# Patient Record
Sex: MALE | Race: ASIAN | NOT HISPANIC OR LATINO | Employment: FULL TIME | URBAN - METROPOLITAN AREA
[De-identification: names, ages, dates, MRNs, and addresses within clinical notes are randomized per-mention and may not be internally consistent; named-entity substitution may affect disease eponyms.]

---

## 2020-05-21 ENCOUNTER — HOSPITAL ENCOUNTER (OUTPATIENT)
Facility: MEDICAL CENTER | Age: 60
End: 2020-05-21
Payer: COMMERCIAL

## 2020-05-26 LAB
SARS-COV-2 RNA SPEC QL NAA+PROBE: NOT DETECTED
SPECIMEN SOURCE: NORMAL

## 2021-04-12 ENCOUNTER — OFFICE VISIT (OUTPATIENT)
Dept: MEDICAL GROUP | Age: 61
End: 2021-04-12
Payer: COMMERCIAL

## 2021-04-12 VITALS
OXYGEN SATURATION: 95 % | HEIGHT: 65 IN | DIASTOLIC BLOOD PRESSURE: 90 MMHG | TEMPERATURE: 97.4 F | WEIGHT: 133 LBS | BODY MASS INDEX: 22.16 KG/M2 | SYSTOLIC BLOOD PRESSURE: 180 MMHG | HEART RATE: 80 BPM

## 2021-04-12 DIAGNOSIS — R42 VERTIGO: ICD-10-CM

## 2021-04-12 DIAGNOSIS — I10 ESSENTIAL HYPERTENSION: ICD-10-CM

## 2021-04-12 DIAGNOSIS — Z00.00 BLOOD TESTS FOR ROUTINE GENERAL PHYSICAL EXAMINATION: ICD-10-CM

## 2021-04-12 DIAGNOSIS — Z76.89 ESTABLISHING CARE WITH NEW DOCTOR, ENCOUNTER FOR: ICD-10-CM

## 2021-04-12 DIAGNOSIS — Z12.5 SCREENING FOR PROSTATE CANCER: ICD-10-CM

## 2021-04-12 DIAGNOSIS — Z11.59 NEED FOR HEPATITIS C SCREENING TEST: ICD-10-CM

## 2021-04-12 PROCEDURE — 99203 OFFICE O/P NEW LOW 30 MIN: CPT | Performed by: PHYSICIAN ASSISTANT

## 2021-04-12 RX ORDER — ADHESIVE BANDAGE 3/4"
BANDAGE TOPICAL
Qty: 1 EACH | Refills: 0 | Status: SHIPPED | OUTPATIENT
Start: 2021-04-12

## 2021-04-12 RX ORDER — LISINOPRIL 20 MG/1
20 TABLET ORAL DAILY
Qty: 30 TABLET | Refills: 2 | Status: SHIPPED | OUTPATIENT
Start: 2021-04-12 | End: 2021-04-26 | Stop reason: SDUPTHER

## 2021-04-12 ASSESSMENT — PATIENT HEALTH QUESTIONNAIRE - PHQ9: CLINICAL INTERPRETATION OF PHQ2 SCORE: 0

## 2021-04-12 NOTE — PROGRESS NOTES
"cc: Establish care    Subjective:     HPI  Rashaad Bowman is a 60 y.o. male presenting to establish care.  It has been over 10 years since he has been seen by primary care provider.  He works at the [employer] as a .  He has no significant past medical history.  He is up-to-date on colonoscopy.    Hypertension  Blood pressure is elevated in clinic today.  In further discussion he states that he was drinking fairly excessively, at least half a bottle of wine and 2-3 shots of scotch a night.  He has cut back on this and has 1 to 2 glasses of wine nightly now.  Denies chest pain, palpitations, shortness of breath, headaches, visual changes, mental status changes, abdominal pain, nausea, vomiting, lower extremity edema.    Vertigo  In the past 6 weeks he has had 2 separate episodes of sudden onset of dizziness.  He states that it occurs with positional changes, feels like the room is spinning.  Resolves within less than a minute.  It has been 2 weeks since he has felt dizzy.  Denies headaches, mental status changes, weakness, slurred speech, nausea, vomiting, diaphoresis.    Review of systems:  See above.       Current Outpatient Medications:   •  lisinopril (PRINIVIL) 20 MG Tab, Take 1 tablet by mouth every day., Disp: 30 tablet, Rfl: 2  •  Blood Pressure Monitoring (BLOOD PRESSURE CUFF) Misc, 1 adult BP Cuff, Disp: 1 Each, Rfl: 0    Allergies, past medical history, past surgical history, family history, social history reviewed and updated    Objective:     Vitals: BP (!) 180/90   Pulse 80   Temp 36.3 °C (97.4 °F) (Temporal)   Ht 1.651 m (5' 5\")   Wt 60.3 kg (133 lb)   SpO2 95%   BMI 22.13 kg/m²   General: Alert, pleasant, NAD  HEENT: Normocephalic. Neck supple.  No thyromegaly or masses palpated. No cervical or supraclavicular lymphadenopathy. No carotid bruits   Heart: Regular rate and rhythm.  S1 and S2 normal.  No murmurs appreciated.  Respiratory: Normal respiratory effort.  Clear to auscultation " bilaterally.  Neurological: No tremors, sensation grossly intact, patellar and biceps reflexes 2+ symmetric, tone/strength normal, gait is normal, rapid movements normal, finger-to-nose intact, heel-knee-shin intact, CN2-12 intact, no pronator drift, romberg negative, no clonus.  Negative [place]-Hallpike maneuver.  Skin: Warm, dry, no rashes.  Extremities: No leg edema.  Radial pulses 2+ symmetric  Psych:  Affect/mood is normal, judgement is good, memory is intact, grooming is appropriate.    Assessment/Plan:     Rashaad was seen today for establish care and dizziness.    Diagnoses and all orders for this visit:    Essential hypertension  -Blood pressure is moderately elevated in clinic today.  No signs of endorgan damage.  Will check below labs.  Will also start on 20 mg of lisinopril.  Medication side effects reviewed.  We will follow-up in 2 weeks to review blood pressure log.  Discussed abusers of presentation of headache, nausea, vomiting, diaphoresis, chest pain to present to ED immediately.  -     Comp Metabolic Panel; Future  -     Lipid Profile; Future  -     TSH WITH REFLEX TO FT4; Future  -     lisinopril (PRINIVIL) 20 MG Tab; Take 1 tablet by mouth every day.  -     Blood Pressure Monitoring (BLOOD PRESSURE CUFF) Misc; 1 adult BP Cuff    Vertigo  -No neuro exam is benign.  Negative [place]-Hallpike maneuver.  Is been over 2 weeks since he has had an episode of dizziness.  Based off of description sounds like BPPV.  Discussed if symptoms represent then to follow-up with the clinic.    Blood tests for routine general physical examination  -     CBC WITH DIFFERENTIAL; Future  -     Comp Metabolic Panel; Future  -     Lipid Profile; Future  -     TSH WITH REFLEX TO FT4; Future    Screening for prostate cancer  -     PROSTATE SPECIFIC AG SCREENING; Future    Need for hepatitis C screening test  -     HCV Scrn ( 9926-0909 [employer]); Future    Establishing care with new doctor, encounter for  -We will request records  and review when received.  Discussed immunizations today, he declines.      Return in about 2 weeks (around 4/26/2021) for HTN, Lab Review.

## 2021-04-19 ENCOUNTER — HOSPITAL ENCOUNTER (OUTPATIENT)
Dept: LAB | Facility: MEDICAL CENTER | Age: 61
End: 2021-04-19
Attending: PHYSICIAN ASSISTANT
Payer: COMMERCIAL

## 2021-04-19 DIAGNOSIS — Z00.00 BLOOD TESTS FOR ROUTINE GENERAL PHYSICAL EXAMINATION: ICD-10-CM

## 2021-04-19 DIAGNOSIS — D75.89 MACROCYTOSIS WITHOUT ANEMIA: ICD-10-CM

## 2021-04-19 DIAGNOSIS — I10 ESSENTIAL HYPERTENSION: ICD-10-CM

## 2021-04-19 DIAGNOSIS — Z12.5 SCREENING FOR PROSTATE CANCER: ICD-10-CM

## 2021-04-19 DIAGNOSIS — Z11.59 NEED FOR HEPATITIS C SCREENING TEST: ICD-10-CM

## 2021-04-19 LAB
ALBUMIN SERPL BCP-MCNC: 4 G/DL (ref 3.2–4.9)
ALBUMIN/GLOB SERPL: 1.3 G/DL
ALP SERPL-CCNC: 39 U/L (ref 30–99)
ALT SERPL-CCNC: 15 U/L (ref 2–50)
ANION GAP SERPL CALC-SCNC: 7 MMOL/L (ref 7–16)
AST SERPL-CCNC: 15 U/L (ref 12–45)
BASOPHILS # BLD AUTO: 0.6 % (ref 0–1.8)
BASOPHILS # BLD: 0.03 K/UL (ref 0–0.12)
BILIRUB SERPL-MCNC: 0.9 MG/DL (ref 0.1–1.5)
BUN SERPL-MCNC: 22 MG/DL (ref 8–22)
CALCIUM SERPL-MCNC: 8.9 MG/DL (ref 8.5–10.5)
CHLORIDE SERPL-SCNC: 102 MMOL/L (ref 96–112)
CHOLEST SERPL-MCNC: 246 MG/DL (ref 100–199)
CO2 SERPL-SCNC: 24 MMOL/L (ref 20–33)
CREAT SERPL-MCNC: 1.39 MG/DL (ref 0.5–1.4)
EOSINOPHIL # BLD AUTO: 0.39 K/UL (ref 0–0.51)
EOSINOPHIL NFR BLD: 8.3 % (ref 0–6.9)
ERYTHROCYTE [DISTWIDTH] IN BLOOD BY AUTOMATED COUNT: 49.2 FL (ref 35.9–50)
FASTING STATUS PATIENT QL REPORTED: NORMAL
GLOBULIN SER CALC-MCNC: 3 G/DL (ref 1.9–3.5)
GLUCOSE SERPL-MCNC: 89 MG/DL (ref 65–99)
HCT VFR BLD AUTO: 47.1 % (ref 42–52)
HCV AB SER QL: NORMAL
HDLC SERPL-MCNC: 75 MG/DL
HGB BLD-MCNC: 15.6 G/DL (ref 14–18)
IMM GRANULOCYTES # BLD AUTO: 0.04 K/UL (ref 0–0.11)
IMM GRANULOCYTES NFR BLD AUTO: 0.8 % (ref 0–0.9)
LDLC SERPL CALC-MCNC: 157 MG/DL
LYMPHOCYTES # BLD AUTO: 1.81 K/UL (ref 1–4.8)
LYMPHOCYTES NFR BLD: 38.4 % (ref 22–41)
MCH RBC QN AUTO: 33.2 PG (ref 27–33)
MCHC RBC AUTO-ENTMCNC: 33.1 G/DL (ref 33.7–35.3)
MCV RBC AUTO: 100.2 FL (ref 81.4–97.8)
MONOCYTES # BLD AUTO: 0.55 K/UL (ref 0–0.85)
MONOCYTES NFR BLD AUTO: 11.7 % (ref 0–13.4)
NEUTROPHILS # BLD AUTO: 1.89 K/UL (ref 1.82–7.42)
NEUTROPHILS NFR BLD: 40.2 % (ref 44–72)
NRBC # BLD AUTO: 0 K/UL
NRBC BLD-RTO: 0 /100 WBC
PLATELET # BLD AUTO: 163 K/UL (ref 164–446)
PMV BLD AUTO: 10.3 FL (ref 9–12.9)
POTASSIUM SERPL-SCNC: 4.1 MMOL/L (ref 3.6–5.5)
PROT SERPL-MCNC: 7 G/DL (ref 6–8.2)
PSA SERPL-MCNC: 1.84 NG/ML (ref 0–4)
RBC # BLD AUTO: 4.7 M/UL (ref 4.7–6.1)
SODIUM SERPL-SCNC: 133 MMOL/L (ref 135–145)
TRIGL SERPL-MCNC: 69 MG/DL (ref 0–149)
TSH SERPL DL<=0.005 MIU/L-ACNC: 4.03 UIU/ML (ref 0.38–5.33)
WBC # BLD AUTO: 4.7 K/UL (ref 4.8–10.8)

## 2021-04-19 PROCEDURE — 84443 ASSAY THYROID STIM HORMONE: CPT

## 2021-04-19 PROCEDURE — 80061 LIPID PANEL: CPT

## 2021-04-19 PROCEDURE — 85025 COMPLETE CBC W/AUTO DIFF WBC: CPT

## 2021-04-19 PROCEDURE — 36415 COLL VENOUS BLD VENIPUNCTURE: CPT

## 2021-04-19 PROCEDURE — G0472 HEP C SCREEN HIGH RISK/OTHER: HCPCS

## 2021-04-19 PROCEDURE — 80053 COMPREHEN METABOLIC PANEL: CPT

## 2021-04-19 PROCEDURE — 84153 ASSAY OF PSA TOTAL: CPT

## 2021-04-21 ENCOUNTER — HOSPITAL ENCOUNTER (OUTPATIENT)
Dept: LAB | Facility: MEDICAL CENTER | Age: 61
End: 2021-04-21
Attending: PHYSICIAN ASSISTANT
Payer: COMMERCIAL

## 2021-04-21 DIAGNOSIS — D75.89 MACROCYTOSIS WITHOUT ANEMIA: ICD-10-CM

## 2021-04-21 PROCEDURE — 36415 COLL VENOUS BLD VENIPUNCTURE: CPT

## 2021-04-21 PROCEDURE — 82607 VITAMIN B-12: CPT

## 2021-04-22 LAB — VIT B12 SERPL-MCNC: 361 PG/ML (ref 211–911)

## 2021-04-26 ENCOUNTER — OFFICE VISIT (OUTPATIENT)
Dept: MEDICAL GROUP | Age: 61
End: 2021-04-26
Payer: COMMERCIAL

## 2021-04-26 VITALS
WEIGHT: 138 LBS | OXYGEN SATURATION: 97 % | SYSTOLIC BLOOD PRESSURE: 160 MMHG | DIASTOLIC BLOOD PRESSURE: 86 MMHG | TEMPERATURE: 96.9 F | HEART RATE: 74 BPM | BODY MASS INDEX: 22.99 KG/M2 | HEIGHT: 65 IN

## 2021-04-26 DIAGNOSIS — D75.89 MACROCYTOSIS WITHOUT ANEMIA: ICD-10-CM

## 2021-04-26 DIAGNOSIS — I10 ESSENTIAL HYPERTENSION: ICD-10-CM

## 2021-04-26 DIAGNOSIS — E78.2 MIXED HYPERLIPIDEMIA: ICD-10-CM

## 2021-04-26 DIAGNOSIS — N28.9 DECREASED RENAL FUNCTION: ICD-10-CM

## 2021-04-26 PROBLEM — E78.5 HYPERLIPIDEMIA: Status: ACTIVE | Noted: 2021-04-26

## 2021-04-26 PROCEDURE — 99213 OFFICE O/P EST LOW 20 MIN: CPT | Performed by: PHYSICIAN ASSISTANT

## 2021-04-26 RX ORDER — LISINOPRIL 40 MG/1
40 TABLET ORAL DAILY
Qty: 90 TABLET | Refills: 3 | Status: SHIPPED | OUTPATIENT
Start: 2021-04-26 | End: 2021-05-20

## 2021-04-26 ASSESSMENT — FIBROSIS 4 INDEX: FIB4 SCORE: 1.4

## 2021-04-26 NOTE — ASSESSMENT & PLAN NOTE
Rashaad presents for follow-up hypertension.  He was started on 20 mg of lisinopril 2 weeks ago.  He is tolerating the medication well.  He has been monitoring his blood pressure at home and it ranges anywhere from the mid 140s-  low 150 systolic.  He did also have labs done and would like to review this today.  Denies dizziness, chest pain, palpitations, shortness of breath, lower extremity edema.

## 2021-04-26 NOTE — PROGRESS NOTES
cc: Hypertension and lab review    Subjective:     HPI    Essential hypertension  Rashaad presents for follow-up hypertension.  He was started on 20 mg of lisinopril 2 weeks ago.  He is tolerating the medication well.  He has been monitoring his blood pressure at home and it ranges anywhere from the mid 140s-  low 150 systolic.  He did also have labs done and would like to review this today.  Denies dizziness, chest pain, palpitations, shortness of breath, lower extremity edema.    Results for RASHAAD JEFFERS (MRN [id number]) as of 4/26/2021 08:14   Ref. Range 4/19/2021 08:31   WBC Latest Ref Range: 4.8 - 10.8 K/uL 4.7 (L)   RBC Latest Ref Range: 4.70 - 6.10 M/uL 4.70   Hemoglobin Latest Ref Range: 14.0 - 18.0 g/dL 15.6   Hematocrit Latest Ref Range: 42.0 - 52.0 % 47.1   MCV Latest Ref Range: 81.4 - 97.8 fL 100.2 (H)   MCH Latest Ref Range: 27.0 - 33.0 pg 33.2 (H)   MCHC Latest Ref Range: 33.7 - 35.3 g/dL 33.1 (L)   RDW Latest Ref Range: 35.9 - 50.0 fL 49.2   Platelet Count Latest Ref Range: 164 - 446 K/uL 163 (L)   MPV Latest Ref Range: 9.0 - 12.9 fL 10.3   Neutrophils-Polys Latest Ref Range: 44.00 - 72.00 % 40.20 (L)   Neutrophils (Absolute) Latest Ref Range: 1.82 - 7.42 K/uL 1.89   Lymphocytes Latest Ref Range: 22.00 - 41.00 % 38.40   Lymphs (Absolute) Latest Ref Range: 1.00 - 4.80 K/uL 1.81   Monocytes Latest Ref Range: 0.00 - 13.40 % 11.70   Monos (Absolute) Latest Ref Range: 0.00 - 0.85 K/uL 0.55   Eosinophils Latest Ref Range: 0.00 - 6.90 % 8.30 (H)   Eos (Absolute) Latest Ref Range: 0.00 - 0.51 K/uL 0.39   Basophils Latest Ref Range: 0.00 - 1.80 % 0.60   Baso (Absolute) Latest Ref Range: 0.00 - 0.12 K/uL 0.03   Immature Granulocytes Latest Ref Range: 0.00 - 0.90 % 0.80   Immature Granulocytes (abs) Latest Ref Range: 0.00 - 0.11 K/uL 0.04   Nucleated RBC Latest Units: /100 WBC 0.00   NRBC (Absolute) Latest Units: K/uL 0.00   Sodium Latest Ref Range: 135 - 145 mmol/L 133 (L)   Potassium Latest Ref Range: 3.6 - 5.5  mmol/L 4.1   Chloride Latest Ref Range: 96 - 112 mmol/L 102   Co2 Latest Ref Range: 20 - 33 mmol/L 24   Anion Gap Latest Ref Range: 7.0 - 16.0  7.0   Glucose Latest Ref Range: 65 - 99 mg/dL 89   Bun Latest Ref Range: 8 - 22 mg/dL 22   Creatinine Latest Ref Range: 0.50 - 1.40 mg/dL 1.39   GFR If  Latest Ref Range: >60 mL/min/1.73 m 2 >60   GFR If Non  Latest Ref Range: >60 mL/min/1.73 m 2 52 (A)   Calcium Latest Ref Range: 8.5 - 10.5 mg/dL 8.9   AST(SGOT) Latest Ref Range: 12 - 45 U/L 15   ALT(SGPT) Latest Ref Range: 2 - 50 U/L 15   Alkaline Phosphatase Latest Ref Range: 30 - 99 U/L 39   Total Bilirubin Latest Ref Range: 0.1 - 1.5 mg/dL 0.9   Albumin Latest Ref Range: 3.2 - 4.9 g/dL 4.0   Total Protein Latest Ref Range: 6.0 - 8.2 g/dL 7.0   Globulin Latest Ref Range: 1.9 - 3.5 g/dL 3.0   A-G Ratio Latest Units: g/dL 1.3   Fasting Status Unknown Fasting   Cholesterol,Tot Latest Ref Range: 100 - 199 mg/dL 246 (H)   Triglycerides Latest Ref Range: 0 - 149 mg/dL 69   HDL Latest Ref Range: >=40 mg/dL 75   LDL Latest Ref Range: <100 mg/dL 157 (H)   Prostatic Specific Antigen Tot Latest Ref Range: 0.00 - 4.00 ng/mL 1.84   TSH Latest Ref Range: 0.380 - 5.330 uIU/mL 4.030   Hepatitis C Antibody Latest Ref Range: Non-Reactive  Non-Reactive   Results for MATTHEW JEFFERS (MRN [id number]) as of 4/26/2021 08:14   Ref. Range 4/21/2021 08:39   Vitamin B12 -True Cobalamin Latest Ref Range: 211 - 911 pg/mL 361       Review of systems:  See above.       Current Outpatient Medications:   •  lisinopril (PRINIVIL) 40 MG tablet, Take 1 tablet by mouth every day for 90 days., Disp: 90 tablet, Rfl: 3  •  Blood Pressure Monitoring (BLOOD PRESSURE CUFF) Misc, 1 adult BP Cuff, Disp: 1 Each, Rfl: 0    Allergies, past medical history, past surgical history, family history, social history reviewed and updated    Objective:     Vitals: /86 (BP Location: Left arm, Patient Position: Sitting, BP Cuff Size: Adult)    "Pulse 74   Temp 36.1 °C (96.9 °F) (Temporal)   Ht 1.651 m (5' 5\")   Wt 62.6 kg (138 lb)   SpO2 97%   BMI 22.96 kg/m²   General: Alert, pleasant, NAD  HEENT: Normocephalic. Neck supple.  No thyromegaly or masses palpated. No cervical or supraclavicular lymphadenopathy. No carotid bruits   Heart: Regular rate and rhythm.  S1 and S2 normal.  No murmurs appreciated.  Respiratory: Normal respiratory effort.  Clear to auscultation bilaterally.  Skin: Warm, dry, no rashes.  Extremities: No leg edema.  Radial pulses 2+ symmetric  Psych:  Affect/mood is normal, judgement is good, memory is intact, grooming is appropriate.    Assessment/Plan:     Rashaad was seen today for hypertension.    Diagnoses and all orders for this visit:    Essential hypertension  - Improving, but blood pressure still elevated.  Will increase lisinopril to 40 mg.  Continue to monitor blood pressure at home and will follow up in 2 weeks LDL is mildly  -     lisinopril (PRINIVIL) 40 MG tablet; Take 1 tablet by mouth every day for 90 days.    Mixed hyperlipidemia  -LDL is elevated, did discuss starting statin medication, however he has significantly cut back on his drinking, he is not exercising and has room for improvement in his diet.  We will try to gain better control through lifestyle modifications.  We will repeat lipid panel in 3 months if still elevated will discuss starting statin therapy  -     Lipid Profile; Future    Decreased renal function  -GFR slightly decreased, BUN/creatinine within normal limits.  He does not hydrate adequately.   We will continue to monitor repeat labs again in 4 weeks.  -     Basic Metabolic Panel; Future    Macrocytosis without anemia  -Vitamin B12 level within normal limits.  Most likely associated with daily alcohol consumption.  Did advise to decrease alcohol intake.  We will continue to monitor repeat labs again in 4 weeks.  -     CBC WITH DIFFERENTIAL; Future        Return in about 2 weeks (around 5/10/2021) " for Medication Check.

## 2021-05-10 ENCOUNTER — APPOINTMENT (OUTPATIENT)
Dept: MEDICAL GROUP | Age: 61
End: 2021-05-10
Payer: COMMERCIAL

## 2021-05-18 ENCOUNTER — APPOINTMENT (OUTPATIENT)
Dept: MEDICAL GROUP | Age: 61
End: 2021-05-18
Payer: COMMERCIAL

## 2021-05-18 ENCOUNTER — TELEPHONE (OUTPATIENT)
Dept: MEDICAL GROUP | Age: 61
End: 2021-05-18

## 2021-05-18 NOTE — TELEPHONE ENCOUNTER
VOICEMAIL  1. Caller Name: Rashaad Bowman    Call Back Number: [phone] (home)     2. Message: Pt left vm stating that his blood pressure medication is not working and is now getting a bad cough that is not going away pt wants to know if he can try an alternative medication. Please advise.    3. Patient approves office to leave a detailed voicemail/MyChart message: yes

## 2021-05-20 DIAGNOSIS — I10 ESSENTIAL HYPERTENSION: ICD-10-CM

## 2021-05-20 RX ORDER — LOSARTAN POTASSIUM 50 MG/1
50 TABLET ORAL DAILY
Qty: 30 TABLET | Refills: 2 | Status: SHIPPED | OUTPATIENT
Start: 2021-05-20 | End: 2021-06-14

## 2021-05-20 NOTE — TELEPHONE ENCOUNTER
Phone Number Called: [phone] (home)       Call outcome: Spoke to patient regarding message below.    Message: Spoke to pt and informed of pcp note, let pt know medication has been changed and new Rx has been sent to pt pharmacy we will see pt in his upcoming appt to check BP.

## 2021-05-24 ENCOUNTER — APPOINTMENT (OUTPATIENT)
Dept: MEDICAL GROUP | Age: 61
End: 2021-05-24
Payer: COMMERCIAL

## 2021-06-11 ENCOUNTER — TELEPHONE (OUTPATIENT)
Dept: MEDICAL GROUP | Age: 61
End: 2021-06-11

## 2021-06-11 NOTE — TELEPHONE ENCOUNTER
As we have already changed his medication once without having a office visit I would prefer to wait until his office visit in 3 days to discuss this in person before switching medications again.  Please have him continue to monitor his blood pressure and also continue on his blood pressure medication.

## 2021-06-11 NOTE — TELEPHONE ENCOUNTER
VOICEMAIL  1. Caller Name: Rashaad Bowman    Call Back Number: [phone] (home)     2. Message: Pt left vm stating that his new hypertension medication is not working he is still coughing and now has a runny nose and a lot of mucus. Pt is requesting a new hypertension medication please advise.    3. Patient approves office to leave a detailed voicemail/MyChart message: yes

## 2021-06-11 NOTE — TELEPHONE ENCOUNTER
Phone Number Called: [phone] (home)       Call outcome: Left detailed message for patient. Informed to call back with any additional questions.    Message: Left vm informing pt of pcp note informed pt to call back if he has any questions.

## 2021-06-14 ENCOUNTER — OFFICE VISIT (OUTPATIENT)
Dept: MEDICAL GROUP | Age: 61
End: 2021-06-14
Payer: COMMERCIAL

## 2021-06-14 VITALS
HEART RATE: 69 BPM | HEIGHT: 65 IN | BODY MASS INDEX: 22.82 KG/M2 | SYSTOLIC BLOOD PRESSURE: 148 MMHG | WEIGHT: 137 LBS | OXYGEN SATURATION: 97 % | DIASTOLIC BLOOD PRESSURE: 88 MMHG | TEMPERATURE: 96.9 F

## 2021-06-14 DIAGNOSIS — I10 ESSENTIAL HYPERTENSION: ICD-10-CM

## 2021-06-14 DIAGNOSIS — J30.2 SEASONAL ALLERGIES: ICD-10-CM

## 2021-06-14 PROCEDURE — 99213 OFFICE O/P EST LOW 20 MIN: CPT | Performed by: PHYSICIAN ASSISTANT

## 2021-06-14 RX ORDER — LOSARTAN POTASSIUM 50 MG/1
75 TABLET ORAL DAILY
Qty: 30 TABLET | Refills: 2
Start: 2021-06-14 | End: 2021-06-23

## 2021-06-14 ASSESSMENT — FIBROSIS 4 INDEX: FIB4 SCORE: 1.4

## 2021-06-14 NOTE — ASSESSMENT & PLAN NOTE
Rashaad presents for follow-up hypertension.  He started to experience a cough with lisinopril and was switched to losartan.  The dry cough has subsided.  However, he has noticed runny nose, and a more productive type cough.  He normally does have seasonal allergies, but this is is not his typical symptoms.  He is not taking anything over-the-counter in addition.  Does have concerns that this is from the starting.  His blood pressures are ranging mostly in the mid 130 systolic, he does still have a few readings in the low 140 stock.  Denies dizziness, chest pain, palpitations, shortness of breath, lower extremity edema.

## 2021-06-14 NOTE — PROGRESS NOTES
"cc: Follow-up hypertension    Subjective:     HPI    Essential hypertension  Rashaad presents for follow-up hypertension.  He started to experience a cough with lisinopril and was switched to losartan.  The dry cough has subsided.  However, he has noticed runny nose, and a more productive type cough.  He normally does have seasonal allergies, but this is is not his typical symptoms.  He is not taking anything over-the-counter in addition.  Does have concerns that this is from the starting.  His blood pressures are ranging mostly in the mid 130 systolic, he does still have a few readings in the low 140 stock.  Denies dizziness, chest pain, palpitations, shortness of breath, lower extremity edema.      Review of systems:  See above.       Current Outpatient Medications:   •  losartan (COZAAR) 50 MG Tab, Take 1.5 Tablets by mouth every day., Disp: 30 tablet, Rfl: 2  •  Blood Pressure Monitoring (BLOOD PRESSURE CUFF) Misc, 1 adult BP Cuff, Disp: 1 Each, Rfl: 0    Allergies, past medical history, past surgical history, family history, social history reviewed and updated    Objective:     Vitals: /88 (BP Location: Left arm, Patient Position: Sitting, BP Cuff Size: Adult)   Pulse 69   Temp 36.1 °C (96.9 °F) (Temporal)   Ht 1.651 m (5' 5\")   Wt 62.1 kg (137 lb)   SpO2 97%   BMI 22.80 kg/m²   General: Alert, pleasant, NAD  HEENT: Normocephalic. Neck supple.  No thyromegaly or masses palpated. No cervical or supraclavicular lymphadenopathy. No carotid bruits.  TMs gray bilaterally.  No sinus pressure tenderness.  Cobblestoning of posterior pharynx.  Uvula midline.  No tonsillar exudate or enlargement.  Heart: Regular rate and rhythm.  S1 and S2 normal.  No murmurs appreciated.  Respiratory: Normal respiratory effort.  Clear to auscultation bilaterally.  Skin: Warm, dry, no rashes.  Extremities: No leg edema.  Radial pulses 2+ symmetric  Psych:  Affect/mood is normal, judgement is good, memory is intact, grooming is " appropriate.    Assessment/Plan:     Rashaad was seen today for medication management and hypertension.    Diagnoses and all orders for this visit:    Essential hypertension  -Blood pressure still elevated, but improving.  Will increase losartan to 75 mg.  Advised to continue to monitor blood pressure.  Did discuss that I do not believe his symptoms are coming from the losartan, believe it is more seasonal allergy related.  However in the event if the antihistamines do not work or symptoms progress then we will plan on switching over to amlodipine.  He will send a [facility] message if this is the case  -     losartan (COZAAR) 50 MG Tab; Take 1.5 Tablets by mouth every day.    Seasonal allergies  -Advised over-the-counter antihistamines.      Return in about 4 weeks (around 7/12/2021) for HTN.

## 2021-06-23 DIAGNOSIS — I10 ESSENTIAL HYPERTENSION: ICD-10-CM

## 2021-06-23 RX ORDER — LOSARTAN POTASSIUM 50 MG/1
TABLET ORAL
Qty: 135 TABLET | Refills: 1 | Status: SHIPPED | OUTPATIENT
Start: 2021-06-23 | End: 2021-11-15

## 2021-07-12 ENCOUNTER — OFFICE VISIT (OUTPATIENT)
Dept: MEDICAL GROUP | Age: 61
End: 2021-07-12
Payer: COMMERCIAL

## 2021-07-12 VITALS
BODY MASS INDEX: 23.32 KG/M2 | TEMPERATURE: 97.4 F | HEIGHT: 65 IN | SYSTOLIC BLOOD PRESSURE: 128 MMHG | OXYGEN SATURATION: 96 % | WEIGHT: 140 LBS | DIASTOLIC BLOOD PRESSURE: 80 MMHG | HEART RATE: 72 BPM

## 2021-07-12 DIAGNOSIS — I10 ESSENTIAL HYPERTENSION: ICD-10-CM

## 2021-07-12 PROCEDURE — 99213 OFFICE O/P EST LOW 20 MIN: CPT | Performed by: PHYSICIAN ASSISTANT

## 2021-07-12 ASSESSMENT — FIBROSIS 4 INDEX: FIB4 SCORE: 1.4

## 2021-07-12 NOTE — ASSESSMENT & PLAN NOTE
Rashaad presents for follow-up hypertension.  He was advised to increase his losartan to 75 mg.  He did this and started having itchy eyes, thought it was due to the medication decreased losartan down to 50 mg.  He has been at this dose for about 2 weeks.  His blood pressure has consistently been ranging in the low 120 systolic.  He is tolerating it well.  Denies dizziness, chest pain, palpitations, shortness of breath, lower extremity edema.

## 2021-08-09 ENCOUNTER — HOSPITAL ENCOUNTER (OUTPATIENT)
Dept: LAB | Facility: MEDICAL CENTER | Age: 61
End: 2021-08-09
Attending: PHYSICIAN ASSISTANT
Payer: COMMERCIAL

## 2021-08-09 DIAGNOSIS — E78.2 MIXED HYPERLIPIDEMIA: ICD-10-CM

## 2021-08-09 DIAGNOSIS — N28.9 DECREASED RENAL FUNCTION: ICD-10-CM

## 2021-08-09 DIAGNOSIS — D75.89 MACROCYTOSIS WITHOUT ANEMIA: ICD-10-CM

## 2021-08-09 LAB
ANION GAP SERPL CALC-SCNC: 13 MMOL/L (ref 7–16)
BASOPHILS # BLD AUTO: 0.7 % (ref 0–1.8)
BASOPHILS # BLD: 0.04 K/UL (ref 0–0.12)
BUN SERPL-MCNC: 21 MG/DL (ref 8–22)
CALCIUM SERPL-MCNC: 9.2 MG/DL (ref 8.5–10.5)
CHLORIDE SERPL-SCNC: 105 MMOL/L (ref 96–112)
CHOLEST SERPL-MCNC: 256 MG/DL (ref 100–199)
CO2 SERPL-SCNC: 21 MMOL/L (ref 20–33)
CREAT SERPL-MCNC: 1.09 MG/DL (ref 0.5–1.4)
EOSINOPHIL # BLD AUTO: 0.41 K/UL (ref 0–0.51)
EOSINOPHIL NFR BLD: 6.9 % (ref 0–6.9)
ERYTHROCYTE [DISTWIDTH] IN BLOOD BY AUTOMATED COUNT: 46.3 FL (ref 35.9–50)
FASTING STATUS PATIENT QL REPORTED: NORMAL
GLUCOSE SERPL-MCNC: 104 MG/DL (ref 65–99)
HCT VFR BLD AUTO: 43.8 % (ref 42–52)
HDLC SERPL-MCNC: 54 MG/DL
HGB BLD-MCNC: 14.4 G/DL (ref 14–18)
IMM GRANULOCYTES # BLD AUTO: 0.06 K/UL (ref 0–0.11)
IMM GRANULOCYTES NFR BLD AUTO: 1 % (ref 0–0.9)
LDLC SERPL CALC-MCNC: 148 MG/DL
LYMPHOCYTES # BLD AUTO: 2.26 K/UL (ref 1–4.8)
LYMPHOCYTES NFR BLD: 38 % (ref 22–41)
MCH RBC QN AUTO: 32.1 PG (ref 27–33)
MCHC RBC AUTO-ENTMCNC: 32.9 G/DL (ref 33.7–35.3)
MCV RBC AUTO: 97.6 FL (ref 81.4–97.8)
MONOCYTES # BLD AUTO: 0.55 K/UL (ref 0–0.85)
MONOCYTES NFR BLD AUTO: 9.3 % (ref 0–13.4)
NEUTROPHILS # BLD AUTO: 2.62 K/UL (ref 1.82–7.42)
NEUTROPHILS NFR BLD: 44.1 % (ref 44–72)
NRBC # BLD AUTO: 0 K/UL
NRBC BLD-RTO: 0 /100 WBC
PLATELET # BLD AUTO: 171 K/UL (ref 164–446)
PMV BLD AUTO: 10.2 FL (ref 9–12.9)
POTASSIUM SERPL-SCNC: 4.5 MMOL/L (ref 3.6–5.5)
RBC # BLD AUTO: 4.49 M/UL (ref 4.7–6.1)
SODIUM SERPL-SCNC: 139 MMOL/L (ref 135–145)
TRIGL SERPL-MCNC: 271 MG/DL (ref 0–149)
WBC # BLD AUTO: 5.9 K/UL (ref 4.8–10.8)

## 2021-08-09 PROCEDURE — 36415 COLL VENOUS BLD VENIPUNCTURE: CPT

## 2021-08-09 PROCEDURE — 80061 LIPID PANEL: CPT

## 2021-08-09 PROCEDURE — 85025 COMPLETE CBC W/AUTO DIFF WBC: CPT

## 2021-08-09 PROCEDURE — 80048 BASIC METABOLIC PNL TOTAL CA: CPT

## 2021-08-16 ENCOUNTER — OFFICE VISIT (OUTPATIENT)
Dept: MEDICAL GROUP | Age: 61
End: 2021-08-16
Payer: COMMERCIAL

## 2021-08-16 VITALS
DIASTOLIC BLOOD PRESSURE: 98 MMHG | HEIGHT: 65 IN | OXYGEN SATURATION: 95 % | SYSTOLIC BLOOD PRESSURE: 150 MMHG | TEMPERATURE: 96.7 F | HEART RATE: 65 BPM | BODY MASS INDEX: 23.63 KG/M2 | WEIGHT: 141.8 LBS

## 2021-08-16 DIAGNOSIS — I10 ESSENTIAL HYPERTENSION: ICD-10-CM

## 2021-08-16 DIAGNOSIS — E78.2 MIXED HYPERLIPIDEMIA: ICD-10-CM

## 2021-08-16 DIAGNOSIS — Z00.00 WELL ADULT EXAM: ICD-10-CM

## 2021-08-16 DIAGNOSIS — Z23 NEED FOR VACCINATION: ICD-10-CM

## 2021-08-16 PROCEDURE — 90471 IMMUNIZATION ADMIN: CPT | Performed by: PHYSICIAN ASSISTANT

## 2021-08-16 PROCEDURE — 90472 IMMUNIZATION ADMIN EACH ADD: CPT | Performed by: PHYSICIAN ASSISTANT

## 2021-08-16 PROCEDURE — 90715 TDAP VACCINE 7 YRS/> IM: CPT | Performed by: PHYSICIAN ASSISTANT

## 2021-08-16 PROCEDURE — 99396 PREV VISIT EST AGE 40-64: CPT | Mod: 25 | Performed by: PHYSICIAN ASSISTANT

## 2021-08-16 PROCEDURE — 90750 HZV VACC RECOMBINANT IM: CPT | Performed by: PHYSICIAN ASSISTANT

## 2021-08-16 ASSESSMENT — FIBROSIS 4 INDEX: FIB4 SCORE: 1.34

## 2021-08-16 NOTE — LETTER
[facility]  Sissy Davila P.A.-C.  [address] [place] [address]  Fax: [phone]   Authorization for Release/Disclosure of   Protected Health Information   Name: MATTHEW BOWMAN : 1960 SSN: [id number]   Address: [address] [place] [address]   [place] [address] Phone:    [phone] (home)    I authorize the entity listed below to release/disclose the PHI below to:   [facility]/Sissy Davila P.A.-C.   Provider or Entity Name:  [facility], [facility]     Address   City, State, Zip       [place], [place]   Phone:      Fax:     Reason for request: continuity of care   Information to be released:    [XX] LAST COLONOSCOPY,  including any PATH REPORT and follow-up  [  ] LAST FIT/COLOGUARD RESULT [  ] LAST DEXA  [  ] LAST MAMMOGRAM  [  ] LAST PAP  [  ] LAST LABS [  ] RETINA EXAM REPORT  [  ] IMMUNIZATION RECORDS  [XX] Release all info      [  ] Check here and initial the line next to each item to release ALL health information INCLUDING  _____ Care and treatment for drug and / or alcohol abuse  _____ HIV testing, infection status, or AIDS  _____ Genetic Testing    DATES OF SERVICE OR TIME PERIOD TO BE DISCLOSED: _____________  I understand and acknowledge that:  * This Authorization may be revoked at any time by you in writing, except if your health information has already been used or disclosed.  * Your health information that will be used or disclosed as a result of you signing this authorization could be re-disclosed by the recipient. If this occurs, your re-disclosed health information may no longer be protected by State or Federal laws.  * You may refuse to sign this Authorization. Your refusal will not affect your ability to obtain treatment.  * This Authorization becomes effective upon signing and will  on (date) __________.      If no date is indicated, this Authorization will  one (1) year from the signature date.    Name: Matthew Bowman    Signature:   Date:     2021       PLEASE FAX REQUESTED RECORDS BACK TO: [phone]  [phone]

## 2021-08-16 NOTE — PROGRESS NOTES
Subjective:     CC:   Chief Complaint   Patient presents with   • Annual Exam       HPI:   Rashaad Bowman is a 60 y.o. male who presents for annual exam    Last Colorectal Cancer Screening: UTD  Last Tdap: Due  Received HPV series: Aged out  Hx STDs: No    Exercise: no regular exercise, sedentary  Diet: Good      He  has no past medical history on file.  He  has no past surgical history on file.    Family History   Problem Relation Age of Onset   • Heart Disease Mother         MI   • Asthma Father    • No Known Problems Sister    • Asthma Brother    • No Known Problems Daughter    • No Known Problems Daughter      Social History     Tobacco Use   • Smoking status: Former Smoker     Packs/day: 0.50     Years: 30.00     Pack years: 15.00     Types: Cigarettes     Quit date:      Years since quittin.6   • Smokeless tobacco: Never Used   • Tobacco comment: Quit 15 years ago   Vaping Use   • Vaping Use: Never used   Substance Use Topics   • Alcohol use: Yes     Alcohol/week: 2.4 oz     Types: 4 Glasses of wine per week   • Drug use: Never     He  has no history on file for sexual activity.    Patient Active Problem List    Diagnosis Date Noted   • Hyperlipidemia 2021   • Essential hypertension 2021     Current Outpatient Medications   Medication Sig Dispense Refill   • losartan (COZAAR) 50 MG Tab TAKE 1 TABLET BY MOUTH EVERY  tablet 1   • Blood Pressure Monitoring (BLOOD PRESSURE CUFF) Misc 1 adult BP Cuff 1 Each 0     No current facility-administered medications for this visit.     Allergies   Allergen Reactions   • Seasonal        Review of Systems   Constitutional: Negative for fever, chills and malaise/fatigue.   HENT: Negative for congestion.    Eyes: Negative for pain.   Respiratory: Negative for cough and shortness of breath.    Cardiovascular: Negative for chest pain and leg swelling.   Gastrointestinal: Negative for nausea, vomiting, abdominal pain and diarrhea.   Genitourinary: Negative  "for dysuria and hematuria.   Skin: Negative for rash.   Neurological: Negative for dizziness, focal weakness and headaches.   Endo/Heme/Allergies: Does not bruise/bleed easily.   Psychiatric/Behavioral: Negative for depression.  The patient is not nervous/anxious.      Objective:   /98 (BP Location: Left arm, Patient Position: Sitting, BP Cuff Size: Adult)   Pulse 65   Temp 35.9 °C (96.7 °F) (Temporal)   Ht 1.651 m (5' 5\")   Wt 64.3 kg (141 lb 12.8 oz)   SpO2 95%   BMI 23.60 kg/m²      Wt Readings from Last 4 Encounters:   08/16/21 64.3 kg (141 lb 12.8 oz)   07/12/21 63.5 kg (140 lb)   06/14/21 62.1 kg (137 lb)   04/26/21 62.6 kg (138 lb)       Physical Exam:  Constitutional: Well-developed and well-nourished. Not diaphoretic. No distress.   Skin: Skin is warm and dry. No rash noted.  Head: Atraumatic without lesions.  Eyes: Conjunctivae and extraocular motions are normal. Pupils are equal, round, and reactive to light. No scleral icterus.   Ears:  External ears unremarkable. Tympanic membranes clear and intact.  Nose: Nares patent. Septum midline. Turbinates without erythema nor edema. No discharge.   Mouth/Throat: Tongue normal. Oropharynx is clear and moist. Posterior pharynx without erythema or exudates.  Neck: Supple, trachea midline. Normal range of motion. No thyromegaly present. No lymphadenopathy--cervical or supraclavicular.  Cardiovascular: Regular rate and rhythm, S1 and S2 without murmur, rubs, or gallops.    Respiratory: Effort normal. Clear to auscultation throughout. No adventitious sounds.   Abdomen: Soft, non tender, and without distention. Active bowel sounds in all four quadrants. No rebound, guarding, masses or HSM.  Extremities: No cyanosis, clubbing, erythema, nor edema. Distal pulses intact and symmetric.   Musculoskeletal: All major joints AROM full in all directions without pain.  Neurological: Alert and oriented x 3. Grossly non-focal. Strength and sensation grossly intact. " DTRs 2+/3 and symmetric.   Psychiatric:  Behavior, mood, and affect are appropriate.      Assessment and Plan:     1. Well adult exam  -Increase physical activity as tolerated and reviewed diet control.    2. Mixed hyperlipidemia  -LDL is mildly elevated, it has improved.  He does not exercise at all.  Would like to try and gain better control through lifestyle modifications.  Will monitor repeat labs again in 3 months.  If still elevated will again recommend starting a statin medication.  - Comp Metabolic Panel; Future  -The 10-year CVD risk score (D'Agostino, et al., 2008) is: 27.9%    3. Essential hypertension  -Blood pressure slightly elevated in clinic today.  Normally is well controlled.  Advised to monitor BP at home.  If it is consistently greater than 140 systolic then follow-up    4. Need for vaccination  -Immunizations given in clinic today  - Tdap Vaccine =>8YO IM  - Shingrix Vaccine             Health maintenance:     Labs per orders  Immunizations per orders  Patient counseled about skin care, diet, supplements, and exercise.  Discussed diet and exercise, colorectal cancer screening and prostate cancer screening     Follow-up: Return in about 3 months (around 11/16/2021) for Lab Review.

## 2021-11-04 ENCOUNTER — HOSPITAL ENCOUNTER (OUTPATIENT)
Dept: LAB | Facility: MEDICAL CENTER | Age: 61
End: 2021-11-04
Attending: PHYSICIAN ASSISTANT
Payer: COMMERCIAL

## 2021-11-04 DIAGNOSIS — E78.2 MIXED HYPERLIPIDEMIA: ICD-10-CM

## 2021-11-04 LAB
CHOLEST SERPL-MCNC: 265 MG/DL (ref 100–199)
FASTING STATUS PATIENT QL REPORTED: NORMAL
HDLC SERPL-MCNC: 69 MG/DL
LDLC SERPL CALC-MCNC: 167 MG/DL
TRIGL SERPL-MCNC: 143 MG/DL (ref 0–149)

## 2021-11-04 PROCEDURE — 36415 COLL VENOUS BLD VENIPUNCTURE: CPT

## 2021-11-04 PROCEDURE — 80061 LIPID PANEL: CPT

## 2021-11-15 ENCOUNTER — OFFICE VISIT (OUTPATIENT)
Dept: MEDICAL GROUP | Age: 61
End: 2021-11-15
Payer: COMMERCIAL

## 2021-11-15 VITALS
OXYGEN SATURATION: 98 % | WEIGHT: 138.6 LBS | BODY MASS INDEX: 23.09 KG/M2 | DIASTOLIC BLOOD PRESSURE: 90 MMHG | SYSTOLIC BLOOD PRESSURE: 160 MMHG | TEMPERATURE: 98.4 F | HEIGHT: 65 IN | HEART RATE: 59 BPM

## 2021-11-15 DIAGNOSIS — I10 ESSENTIAL HYPERTENSION: ICD-10-CM

## 2021-11-15 DIAGNOSIS — E78.2 MIXED HYPERLIPIDEMIA: ICD-10-CM

## 2021-11-15 PROCEDURE — 99213 OFFICE O/P EST LOW 20 MIN: CPT | Performed by: PHYSICIAN ASSISTANT

## 2021-11-15 RX ORDER — ATORVASTATIN CALCIUM 10 MG/1
10 TABLET, FILM COATED ORAL DAILY
Qty: 30 TABLET | Refills: 2 | Status: SHIPPED | OUTPATIENT
Start: 2021-11-15 | End: 2021-12-27

## 2021-11-15 RX ORDER — LOSARTAN POTASSIUM 50 MG/1
75 TABLET ORAL
Qty: 135 TABLET | Refills: 1
Start: 2021-11-15 | End: 2021-12-27 | Stop reason: SDUPTHER

## 2021-11-15 ASSESSMENT — FIBROSIS 4 INDEX: FIB4 SCORE: 1.36

## 2021-11-15 NOTE — PROGRESS NOTES
"cc: Lab review and hypertension  Subjective:     HPI    Rashaad Jeffers is a 61 y.o. male presenting for lab review we have been monitoring his cholesterol levels.   LDL is actually worsened.  He does not eat a lot of red meats, is not exercising regularly.  He would be agreeable to starting statin medication at this time.    Hypertension  He has been monitoring his blood pressure at home.  It has been consistently been elevated.  Ranging around 145-150 systolic.  He is currently taking 50 mg of losartan.  Denies dizziness, chest pain, palpitations, shortness of breath, lower extremity edema, headaches, visual changes    Results for RASHAAD JEFFERS (MRN [id number]) as of 11/15/2021 08:27   Ref. Range 11/4/2021 13:18   Cholesterol,Tot Latest Ref Range: 100 - 199 mg/dL 265 (H)   Triglycerides Latest Ref Range: 0 - 149 mg/dL 143   HDL Latest Ref Range: >=40 mg/dL 69   LDL Latest Ref Range: <100 mg/dL 167 (H)     Review of systems:  See above.       Current Outpatient Medications:   •  losartan (COZAAR) 50 MG Tab, Take 1.5 Tablets by mouth every day., Disp: 135 Tablet, Rfl: 1  •  atorvastatin (LIPITOR) 10 MG Tab, Take 1 Tablet by mouth every day., Disp: 30 Tablet, Rfl: 2  •  Blood Pressure Monitoring (BLOOD PRESSURE CUFF) Misc, 1 adult BP Cuff, Disp: 1 Each, Rfl: 0    Allergies, past medical history, past surgical history, family history, social history reviewed and updated    Objective:     Vitals: /90 (BP Location: Left arm, Patient Position: Sitting, BP Cuff Size: Adult)   Pulse (!) 59   Temp 36.9 °C (98.4 °F) (Temporal)   Ht 1.651 m (5' 5\")   Wt 62.9 kg (138 lb 9.6 oz)   SpO2 98%   BMI 23.06 kg/m²   General: Alert, pleasant, NAD  HEENT: Normocephalic. Neck supple.  No thyromegaly or masses palpated. No cervical or supraclavicular lymphadenopathy. No carotid bruits   Heart: Regular rate and rhythm.  S1 and S2 normal.  No murmurs appreciated.  Respiratory: Normal respiratory effort.  Clear to auscultation " bilaterally.  Skin: Warm, dry, no rashes.  Extremities: No leg edema.  Radial pulses 2+ symmetric  Psych:  Affect/mood is normal, judgement is good, memory is intact, grooming is appropriate.    Assessment/Plan:     Rashaad was seen today for lab results and hypertension.    Diagnoses and all orders for this visit:    Mixed hyperlipidemia  -Cholesterol level has consistently been elevated. The 10-year CVD risk score (MIKIE'Agoino, et al., 2008) is: 27.7%.  Will start on atorvastatin.  Medication side effects reviewed.  Advised to increase physical activity as tolerated and better control diet.  Will monitor and repeat lipid panel again in 3 months.  Will adjust medication if needed pending results  -     Lipid Profile; Future  -     atorvastatin (LIPITOR) 10 MG Tab; Take 1 Tablet by mouth every day.    Essential hypertension  -Uncontrolled.  Will increase losartan to 75 mg.  Advised if after 2 weeks blood pressure still greater than 140 systolic to increase to 2 tabs-100 mg.  We will follow-up in 4 weeks.  -     losartan (COZAAR) 50 MG Tab; Take 1.5 Tablets by mouth every day.            Return in about 4 weeks (around 12/13/2021) for HTN, Medication Check.

## 2021-12-14 ENCOUNTER — TELEPHONE (OUTPATIENT)
Dept: MEDICAL GROUP | Age: 61
End: 2021-12-14
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
## 2021-12-27 ENCOUNTER — OFFICE VISIT (OUTPATIENT)
Dept: MEDICAL GROUP | Age: 61
End: 2021-12-27
Payer: COMMERCIAL

## 2021-12-27 VITALS
OXYGEN SATURATION: 97 % | WEIGHT: 135.4 LBS | DIASTOLIC BLOOD PRESSURE: 80 MMHG | HEART RATE: 64 BPM | HEIGHT: 65 IN | TEMPERATURE: 97 F | BODY MASS INDEX: 22.56 KG/M2 | SYSTOLIC BLOOD PRESSURE: 160 MMHG

## 2021-12-27 DIAGNOSIS — I10 ESSENTIAL HYPERTENSION: ICD-10-CM

## 2021-12-27 DIAGNOSIS — E78.2 MIXED HYPERLIPIDEMIA: ICD-10-CM

## 2021-12-27 PROCEDURE — 99213 OFFICE O/P EST LOW 20 MIN: CPT | Performed by: PHYSICIAN ASSISTANT

## 2021-12-27 RX ORDER — LOSARTAN POTASSIUM 100 MG/1
100 TABLET ORAL
Qty: 90 TABLET | Refills: 3 | Status: SHIPPED | OUTPATIENT
Start: 2021-12-27 | End: 2022-02-07 | Stop reason: SDUPTHER

## 2021-12-27 RX ORDER — ROSUVASTATIN CALCIUM 5 MG/1
5 TABLET, COATED ORAL EVERY EVENING
Qty: 30 TABLET | Refills: 3 | Status: SHIPPED | OUTPATIENT
Start: 2021-12-27 | End: 2022-01-14

## 2021-12-27 ASSESSMENT — FIBROSIS 4 INDEX: FIB4 SCORE: 1.36

## 2021-12-27 NOTE — PROGRESS NOTES
"cc: Follow-up hypertension and medication review    Subjective:     HPI  Hypertension  Rashaad Bowman is a 61 y.o. male presenting for follow-up hypertension.  Is currently taking 75 mg of losartan.  He is monitoring his blood pressures at home and they are stable ranging around 140 systolic, he has had a few readings below 140 systolic.  He is tolerating the medication well.  Denies dizziness, chest pain, palpitations, shortness of breath.    Hyperlipidemia  He was started on 10 mg of atorvastatin.  He started to have muscle aches.  He stopped taking the Lipitor and symptoms resolved.  He has started exercising for about 20 minutes a day.  He is working on better control of his diet.      Review of systems:  See above.       Current Outpatient Medications:   •  rosuvastatin (CRESTOR) 5 MG Tab, Take 1 Tablet by mouth every evening., Disp: 30 Tablet, Rfl: 3  •  losartan (COZAAR) 100 MG Tab, Take 1 Tablet by mouth every day., Disp: 90 Tablet, Rfl: 3  •  Blood Pressure Monitoring (BLOOD PRESSURE CUFF) Misc, 1 adult BP Cuff, Disp: 1 Each, Rfl: 0    Allergies, past medical history, past surgical history, family history, social history reviewed and updated    Objective:     Vitals: /80 (BP Location: Left arm, Patient Position: Sitting, BP Cuff Size: Adult)   Pulse 64   Temp 36.1 °C (97 °F) (Temporal)   Ht 1.651 m (5' 5\")   Wt 61.4 kg (135 lb 6.4 oz)   SpO2 97%   BMI 22.53 kg/m²   General: Alert, pleasant, NAD  HEENT: Normocephalic. Neck supple.  No thyromegaly or masses palpated. No cervical or supraclavicular lymphadenopathy. No carotid bruits   Heart: Regular rate and rhythm.  S1 and S2 normal.  No murmurs appreciated.  Respiratory: Normal respiratory effort.  Clear to auscultation bilaterally.  Skin: Warm, dry, no rashes.  Extremities: No leg edema.  Radial pulses 2+ symmetric  Psych:  Affect/mood is normal, judgement is good, memory is intact, grooming is appropriate.    Assessment/Plan:     Rashaad was seen " today for medication management.    Diagnoses and all orders for this visit:    Essential hypertension  -Improving, but still uncontrolled.  Will increase losartan to 100 mg.  Advised to continue monitor blood pressure at home we will follow-up in 4 weeks.  -     losartan (COZAAR) 100 MG Tab; Take 1 Tablet by mouth every day.    Mixed hyperlipidemia  -He did not tolerate Lipitor.  The 10-year CVD risk score (MIKIE'Agoino, et al., 2008) is: 27.7%.  It is recommended for him to be on a statin medication.  We will trial 5 mg of Crestor.  Medication side effects reviewed.  If he starts to exhibit symptoms again then DC Crestor  -     rosuvastatin (CRESTOR) 5 MG Tab; Take 1 Tablet by mouth every evening.        Return in about 4 weeks (around 1/24/2022) for HTN, Medication Check.

## 2022-02-07 ENCOUNTER — OFFICE VISIT (OUTPATIENT)
Dept: MEDICAL GROUP | Age: 62
End: 2022-02-07
Payer: COMMERCIAL

## 2022-02-07 VITALS
SYSTOLIC BLOOD PRESSURE: 150 MMHG | WEIGHT: 132.6 LBS | BODY MASS INDEX: 22.09 KG/M2 | TEMPERATURE: 96.7 F | DIASTOLIC BLOOD PRESSURE: 80 MMHG | HEART RATE: 65 BPM | OXYGEN SATURATION: 97 % | HEIGHT: 65 IN

## 2022-02-07 DIAGNOSIS — E78.2 MIXED HYPERLIPIDEMIA: ICD-10-CM

## 2022-02-07 DIAGNOSIS — I10 ESSENTIAL HYPERTENSION: ICD-10-CM

## 2022-02-07 PROCEDURE — 99213 OFFICE O/P EST LOW 20 MIN: CPT | Performed by: PHYSICIAN ASSISTANT

## 2022-02-07 RX ORDER — LOSARTAN POTASSIUM 100 MG/1
100 TABLET ORAL
Qty: 90 TABLET | Refills: 3 | Status: SHIPPED | OUTPATIENT
Start: 2022-02-07

## 2022-02-07 RX ORDER — LOSARTAN POTASSIUM 50 MG/1
TABLET ORAL
COMMUNITY
Start: 2022-01-11 | End: 2022-02-07

## 2022-02-07 ASSESSMENT — PATIENT HEALTH QUESTIONNAIRE - PHQ9: CLINICAL INTERPRETATION OF PHQ2 SCORE: 0

## 2022-02-07 ASSESSMENT — FIBROSIS 4 INDEX: FIB4 SCORE: 1.36

## 2022-02-07 NOTE — PROGRESS NOTES
"cc: Hypertension    Subjective:     HPI  Rashaad Bowman is a 61 y.o. male presenting for follow-up hypertension.  Last visit he his blood pressure was elevated, was advised to increase his losartan to 75 mg.  Unfortunately the pharmacy gave him a refill of 75 mg of losartan and did not increase it to 100.  He has not been checking his blood pressures at home.  Blood pressure is elevated in clinic today.  Denies dizziness, chest pain, palpitations, shortness of breath.    He was also switched to Crestor from atorvastatin due to muscle aches.  He took the Crestor for a few days, states he felt \"weird\", he does have plans to restart this to see if it was actually a medication that was causing his symptoms.        Review of systems:  See above.       Current Outpatient Medications:   •  losartan (COZAAR) 100 MG Tab, Take 1 Tablet by mouth every day., Disp: 90 Tablet, Rfl: 3  •  rosuvastatin (CRESTOR) 5 MG Tab, TAKE 1 TABLET BY MOUTH EVERY DAY IN THE EVENING, Disp: 90 Tablet, Rfl: 3  •  Blood Pressure Monitoring (BLOOD PRESSURE CUFF) Misc, 1 adult BP Cuff, Disp: 1 Each, Rfl: 0    Allergies, past medical history, past surgical history, family history, social history reviewed and updated    Objective:     Vitals: /80   Pulse 65   Temp 35.9 °C (96.7 °F) (Temporal)   Ht 1.651 m (5' 5\")   Wt 60.1 kg (132 lb 9.6 oz)   SpO2 97%   BMI 22.07 kg/m²   General: Alert, pleasant, NAD  HEENT: Normocephalic. Neck supple.  No thyromegaly or masses palpated. No cervical or supraclavicular lymphadenopathy. No carotid bruits   Heart: Regular rate and rhythm.  S1 and S2 normal.  No murmurs appreciated.  Respiratory: Normal respiratory effort.  Clear to auscultation bilaterally.  Skin: Warm, dry, no rashes.  Extremities: No leg edema.  Radial pulses 2+ symmetric  Psych:  Affect/mood is normal, judgement is good, memory is intact, grooming is appropriate.    Assessment/Plan:     Rashaad was seen today for hypertension.    Diagnoses and " all orders for this visit:    Essential hypertension  -Blood pressure is elevated in clinic today.  Advised to increase his losartan 100 mg.  Monitor blood pressures at home.  We will follow-up in 4 weeks.  -     losartan (COZAAR) 100 MG Tab; Take 1 Tablet by mouth every day.    Hyperlipidemia  Did advise to restart the Crestor to see if symptoms represent.  If he is not tolerating the Crestor advised to send [employer] message and will plan on sending in pravastatin.    Return in about 4 weeks (around 3/7/2022) for HTN, Medication Check.

## 2024-12-18 SDOH — ECONOMIC STABILITY: TRANSPORTATION INSECURITY
IN THE PAST 12 MONTHS, HAS LACK OF RELIABLE TRANSPORTATION KEPT YOU FROM MEDICAL APPOINTMENTS, MEETINGS, WORK OR FROM GETTING THINGS NEEDED FOR DAILY LIVING?: NO

## 2024-12-18 SDOH — ECONOMIC STABILITY: TRANSPORTATION INSECURITY
IN THE PAST 12 MONTHS, HAS THE LACK OF TRANSPORTATION KEPT YOU FROM MEDICAL APPOINTMENTS OR FROM GETTING MEDICATIONS?: NO

## 2024-12-18 SDOH — ECONOMIC STABILITY: INCOME INSECURITY: HOW HARD IS IT FOR YOU TO PAY FOR THE VERY BASICS LIKE FOOD, HOUSING, MEDICAL CARE, AND HEATING?: NOT VERY HARD

## 2024-12-18 SDOH — HEALTH STABILITY: PHYSICAL HEALTH: ON AVERAGE, HOW MANY MINUTES DO YOU ENGAGE IN EXERCISE AT THIS LEVEL?: 0 MIN

## 2024-12-18 SDOH — HEALTH STABILITY: PHYSICAL HEALTH: ON AVERAGE, HOW MANY DAYS PER WEEK DO YOU ENGAGE IN MODERATE TO STRENUOUS EXERCISE (LIKE A BRISK WALK)?: 0 DAYS

## 2024-12-18 SDOH — ECONOMIC STABILITY: FOOD INSECURITY: WITHIN THE PAST 12 MONTHS, THE FOOD YOU BOUGHT JUST DIDN'T LAST AND YOU DIDN'T HAVE MONEY TO GET MORE.: NEVER TRUE

## 2024-12-18 SDOH — ECONOMIC STABILITY: FOOD INSECURITY: WITHIN THE PAST 12 MONTHS, YOU WORRIED THAT YOUR FOOD WOULD RUN OUT BEFORE YOU GOT MONEY TO BUY MORE.: NEVER TRUE

## 2024-12-18 SDOH — ECONOMIC STABILITY: HOUSING INSECURITY

## 2024-12-18 SDOH — HEALTH STABILITY: MENTAL HEALTH
STRESS IS WHEN SOMEONE FEELS TENSE, NERVOUS, ANXIOUS, OR CAN'T SLEEP AT NIGHT BECAUSE THEIR MIND IS TROUBLED. HOW STRESSED ARE YOU?: NOT AT ALL

## 2024-12-18 SDOH — ECONOMIC STABILITY: TRANSPORTATION INSECURITY
IN THE PAST 12 MONTHS, HAS LACK OF TRANSPORTATION KEPT YOU FROM MEETINGS, WORK, OR FROM GETTING THINGS NEEDED FOR DAILY LIVING?: NO

## 2024-12-18 ASSESSMENT — SOCIAL DETERMINANTS OF HEALTH (SDOH)
HOW OFTEN DO YOU HAVE A DRINK CONTAINING ALCOHOL: 4 OR MORE TIMES A WEEK
HOW HARD IS IT FOR YOU TO PAY FOR THE VERY BASICS LIKE FOOD, HOUSING, MEDICAL CARE, AND HEATING?: NOT VERY HARD
HOW OFTEN DO YOU GET TOGETHER WITH FRIENDS OR RELATIVES?: MORE THAN THREE TIMES A WEEK
HOW OFTEN DO YOU GET TOGETHER WITH FRIENDS OR RELATIVES?: MORE THAN THREE TIMES A WEEK
HOW OFTEN DO YOU ATTEND CHURCH OR RELIGIOUS SERVICES?: NEVER
HOW OFTEN DO YOU ATTENT MEETINGS OF THE CLUB OR ORGANIZATION YOU BELONG TO?: NEVER
HOW MANY DRINKS CONTAINING ALCOHOL DO YOU HAVE ON A TYPICAL DAY WHEN YOU ARE DRINKING: 3 OR 4
IN THE PAST 12 MONTHS, HAS THE ELECTRIC, GAS, OIL, OR WATER COMPANY THREATENED TO SHUT OFF SERVICE IN YOUR HOME?: NO
DO YOU BELONG TO ANY CLUBS OR ORGANIZATIONS SUCH AS CHURCH GROUPS UNIONS, FRATERNAL OR ATHLETIC GROUPS, OR SCHOOL GROUPS?: NO
HOW OFTEN DO YOU ATTENT MEETINGS OF THE CLUB OR ORGANIZATION YOU BELONG TO?: NEVER
HOW OFTEN DO YOU HAVE SIX OR MORE DRINKS ON ONE OCCASION: NEVER
DO YOU BELONG TO ANY CLUBS OR ORGANIZATIONS SUCH AS CHURCH GROUPS UNIONS, FRATERNAL OR ATHLETIC GROUPS, OR SCHOOL GROUPS?: NO
HOW OFTEN DO YOU ATTEND CHURCH OR RELIGIOUS SERVICES?: NEVER
IN A TYPICAL WEEK, HOW MANY TIMES DO YOU TALK ON THE PHONE WITH FAMILY, FRIENDS, OR NEIGHBORS?: MORE THAN THREE TIMES A WEEK
WITHIN THE PAST 12 MONTHS, YOU WORRIED THAT YOUR FOOD WOULD RUN OUT BEFORE YOU GOT THE MONEY TO BUY MORE: NEVER TRUE
IN A TYPICAL WEEK, HOW MANY TIMES DO YOU TALK ON THE PHONE WITH FAMILY, FRIENDS, OR NEIGHBORS?: MORE THAN THREE TIMES A WEEK

## 2024-12-18 ASSESSMENT — LIFESTYLE VARIABLES
HOW MANY STANDARD DRINKS CONTAINING ALCOHOL DO YOU HAVE ON A TYPICAL DAY: 3 OR 4
SKIP TO QUESTIONS 9-10: 0
HOW OFTEN DO YOU HAVE SIX OR MORE DRINKS ON ONE OCCASION: NEVER
AUDIT-C TOTAL SCORE: 5
HOW OFTEN DO YOU HAVE A DRINK CONTAINING ALCOHOL: 4 OR MORE TIMES A WEEK

## 2024-12-19 ENCOUNTER — OFFICE VISIT (OUTPATIENT)
Dept: MEDICAL GROUP | Facility: MEDICAL CENTER | Age: 64
End: 2024-12-19
Payer: COMMERCIAL

## 2024-12-19 ENCOUNTER — HOSPITAL ENCOUNTER (OUTPATIENT)
Dept: LAB | Facility: MEDICAL CENTER | Age: 64
End: 2024-12-19
Attending: PHYSICIAN ASSISTANT
Payer: COMMERCIAL

## 2024-12-19 VITALS
SYSTOLIC BLOOD PRESSURE: 158 MMHG | DIASTOLIC BLOOD PRESSURE: 118 MMHG | OXYGEN SATURATION: 97 % | WEIGHT: 143 LBS | TEMPERATURE: 97.2 F | HEART RATE: 79 BPM | BODY MASS INDEX: 21.67 KG/M2 | HEIGHT: 68 IN

## 2024-12-19 DIAGNOSIS — Z78.9 ALCOHOL USE: ICD-10-CM

## 2024-12-19 DIAGNOSIS — Z13.228 SCREENING FOR METABOLIC DISORDER: ICD-10-CM

## 2024-12-19 DIAGNOSIS — Z00.00 ENCOUNTER FOR MEDICAL EXAMINATION TO ESTABLISH CARE: ICD-10-CM

## 2024-12-19 DIAGNOSIS — Z12.5 SCREENING FOR PROSTATE CANCER: ICD-10-CM

## 2024-12-19 DIAGNOSIS — I10 ESSENTIAL HYPERTENSION: ICD-10-CM

## 2024-12-19 DIAGNOSIS — E78.2 MIXED HYPERLIPIDEMIA: ICD-10-CM

## 2024-12-19 DIAGNOSIS — Z13.21 ENCOUNTER FOR VITAMIN DEFICIENCY SCREENING: ICD-10-CM

## 2024-12-19 LAB
25(OH)D3 SERPL-MCNC: 17 NG/ML (ref 30–100)
ALBUMIN SERPL BCP-MCNC: 4.3 G/DL (ref 3.2–4.9)
ALBUMIN/GLOB SERPL: 1.3 G/DL
ALP SERPL-CCNC: 53 U/L (ref 30–99)
ALT SERPL-CCNC: 61 U/L (ref 2–50)
ANION GAP SERPL CALC-SCNC: 11 MMOL/L (ref 7–16)
AST SERPL-CCNC: 39 U/L (ref 12–45)
BASOPHILS # BLD AUTO: 0.7 % (ref 0–1.8)
BASOPHILS # BLD: 0.04 K/UL (ref 0–0.12)
BILIRUB SERPL-MCNC: 0.4 MG/DL (ref 0.1–1.5)
BUN SERPL-MCNC: 17 MG/DL (ref 8–22)
CALCIUM ALBUM COR SERPL-MCNC: 8.8 MG/DL (ref 8.5–10.5)
CALCIUM SERPL-MCNC: 9 MG/DL (ref 8.5–10.5)
CHLORIDE SERPL-SCNC: 104 MMOL/L (ref 96–112)
CHOLEST SERPL-MCNC: 303 MG/DL (ref 100–199)
CO2 SERPL-SCNC: 23 MMOL/L (ref 20–33)
CREAT SERPL-MCNC: 1.21 MG/DL (ref 0.5–1.4)
EOSINOPHIL # BLD AUTO: 0.36 K/UL (ref 0–0.51)
EOSINOPHIL NFR BLD: 6.5 % (ref 0–6.9)
ERYTHROCYTE [DISTWIDTH] IN BLOOD BY AUTOMATED COUNT: 44.2 FL (ref 35.9–50)
EST. AVERAGE GLUCOSE BLD GHB EST-MCNC: 114 MG/DL
FASTING STATUS PATIENT QL REPORTED: NORMAL
FOLATE SERPL-MCNC: 5.7 NG/ML
GFR SERPLBLD CREATININE-BSD FMLA CKD-EPI: 67 ML/MIN/1.73 M 2
GLOBULIN SER CALC-MCNC: 3.3 G/DL (ref 1.9–3.5)
GLUCOSE SERPL-MCNC: 105 MG/DL (ref 65–99)
HBA1C MFR BLD: 5.6 % (ref 4–5.6)
HCT VFR BLD AUTO: 48.2 % (ref 42–52)
HDLC SERPL-MCNC: 47 MG/DL
HGB BLD-MCNC: 15.8 G/DL (ref 14–18)
IMM GRANULOCYTES # BLD AUTO: 0.03 K/UL (ref 0–0.11)
IMM GRANULOCYTES NFR BLD AUTO: 0.5 % (ref 0–0.9)
LDLC SERPL CALC-MCNC: 211 MG/DL
LYMPHOCYTES # BLD AUTO: 2.1 K/UL (ref 1–4.8)
LYMPHOCYTES NFR BLD: 38.1 % (ref 22–41)
MCH RBC QN AUTO: 31.9 PG (ref 27–33)
MCHC RBC AUTO-ENTMCNC: 32.8 G/DL (ref 32.3–36.5)
MCV RBC AUTO: 97.2 FL (ref 81.4–97.8)
MONOCYTES # BLD AUTO: 0.59 K/UL (ref 0–0.85)
MONOCYTES NFR BLD AUTO: 10.7 % (ref 0–13.4)
NEUTROPHILS # BLD AUTO: 2.39 K/UL (ref 1.82–7.42)
NEUTROPHILS NFR BLD: 43.5 % (ref 44–72)
NRBC # BLD AUTO: 0 K/UL
NRBC BLD-RTO: 0 /100 WBC (ref 0–0.2)
PLATELET # BLD AUTO: 186 K/UL (ref 164–446)
PMV BLD AUTO: 10.2 FL (ref 9–12.9)
POTASSIUM SERPL-SCNC: 4.8 MMOL/L (ref 3.6–5.5)
PROT SERPL-MCNC: 7.6 G/DL (ref 6–8.2)
PSA SERPL DL<=0.01 NG/ML-MCNC: 1.49 NG/ML (ref 0–4)
RBC # BLD AUTO: 4.96 M/UL (ref 4.7–6.1)
SODIUM SERPL-SCNC: 138 MMOL/L (ref 135–145)
TRIGL SERPL-MCNC: 223 MG/DL (ref 0–149)
VIT B12 SERPL-MCNC: 638 PG/ML (ref 211–911)
WBC # BLD AUTO: 5.5 K/UL (ref 4.8–10.8)

## 2024-12-19 PROCEDURE — 3077F SYST BP >= 140 MM HG: CPT | Performed by: PHYSICIAN ASSISTANT

## 2024-12-19 PROCEDURE — 36415 COLL VENOUS BLD VENIPUNCTURE: CPT

## 2024-12-19 PROCEDURE — 82746 ASSAY OF FOLIC ACID SERUM: CPT

## 2024-12-19 PROCEDURE — 82306 VITAMIN D 25 HYDROXY: CPT

## 2024-12-19 PROCEDURE — 82607 VITAMIN B-12: CPT

## 2024-12-19 PROCEDURE — 80053 COMPREHEN METABOLIC PANEL: CPT

## 2024-12-19 PROCEDURE — 3080F DIAST BP >= 90 MM HG: CPT | Performed by: PHYSICIAN ASSISTANT

## 2024-12-19 PROCEDURE — 84207 ASSAY OF VITAMIN B-6: CPT

## 2024-12-19 PROCEDURE — 99214 OFFICE O/P EST MOD 30 MIN: CPT | Performed by: PHYSICIAN ASSISTANT

## 2024-12-19 PROCEDURE — 84153 ASSAY OF PSA TOTAL: CPT

## 2024-12-19 PROCEDURE — 80061 LIPID PANEL: CPT

## 2024-12-19 PROCEDURE — 83036 HEMOGLOBIN GLYCOSYLATED A1C: CPT

## 2024-12-19 PROCEDURE — 85025 COMPLETE CBC W/AUTO DIFF WBC: CPT

## 2024-12-19 RX ORDER — OLMESARTAN MEDOXOMIL 20 MG/1
20 TABLET ORAL DAILY
Qty: 30 TABLET | Refills: 3 | Status: SHIPPED | OUTPATIENT
Start: 2024-12-19

## 2024-12-19 ASSESSMENT — PATIENT HEALTH QUESTIONNAIRE - PHQ9: CLINICAL INTERPRETATION OF PHQ2 SCORE: 0

## 2024-12-19 NOTE — PROGRESS NOTES
"Subjective:     History of Present Illness  The patient presents to [facility] care.    He has been under the care of Dr. Carnes for approximately 2 years but has not sought medical attention in the past 4 years. He has discontinued the use of losartan and is currently not on any medication. He reports no adverse effects from losartan. He has abstained from alcohol consumption for the past month, following a 40-year history of regular intake. His typical alcohol consumption included 2 shots and 1 beer nightly. He has not consumed any food today but has had coffee and water.    He underwent a colonoscopy a few years ago, during which a polyp was identified and subsequently removed. A follow-up colonoscopy was performed after 5 years, with no abnormalities detected. He was advised to return for another colonoscopy in 10 years.    SOCIAL HISTORY  The patient stopped drinking alcohol about a month ago. He used to drink nightly, consuming 2 shots and 1 beer. He does not smoke. He is  and has 2 children, aged 28 and 26.    FAMILY HISTORY  The patient reports no family history of thyroid problems.    MEDICATIONS  Discontinued: losartan      Current medicines (including changes today)  Current Outpatient Medications   Medication Sig Dispense Refill    olmesartan (BENICAR) 20 MG Tab Take 1 Tablet by mouth every day. 30 Tablet 3    rosuvastatin (CRESTOR) 5 MG Tab TAKE 1 TABLET BY MOUTH EVERY DAY IN THE EVENING 90 Tablet 3     No current facility-administered medications for this visit.     He  has no past medical history on file.    ROS   No chest pain, no shortness of breath, no abdominal pain  Positive ROS as per HPI.  All other systems reviewed and are negative.     Objective:     BP (!) 158/118   Pulse 79   Temp 36.2 °C (97.2 °F) (Temporal)   Ht 1.727 m (5' 8\")   Wt 64.9 kg (143 lb)   SpO2 97%  Body mass index is 21.74 kg/m².  Physical Exam  Lungs were auscultated.  Heart was examined.    Vital Signs  Blood " pressure is 158/118.  Constitutional: Alert, no distress.  Skin: Warm, dry, good turgor, no rashes in visible areas.  Eye: Equal, round and reactive, conjunctiva clear, lids normal.  ENMT: Lips without lesions, good dentition, oropharynx clear.  Neck: Trachea midline, no masses, no thyromegaly. No cervical or supraclavicular lymphadenopathy  Respiratory: Unlabored respiratory effort, lungs clear to auscultation, no wheezes, no ronchi.  Cardiovascular: Normal S1, S2, no murmur, no edema.  Abdomen: Soft, non-tender, no masses, no hepatosplenomegaly.  Psych: Alert and oriented x3, normal affect and mood.      Results          Assessment and Plan:   The following treatment plan was discussed    Assessment & Plan  1. Essential hypertension.  His blood pressure is significantly elevated at 158/118, posing an increased risk for myocardial infarction or cerebrovascular accident. A prescription for olmesartan 20 mg has been issued, with an initial supply of 30 tablets and subsequent refills. He is advised to maintain a healthy diet rich in fruits, vegetables, and lean meats, and to abstain from alcohol consumption.    2. Mixed hyperlipidemia.  A comprehensive panel of laboratory tests will be conducted, including glucose, liver function, kidney function, cholesterol, thyroid, B vitamins, folic acid, and vitamin D. Prostate screening will also be performed. He is advised to complete these tests while fasting. The initiation of cholesterol medication will be deferred until the results of the laboratory tests are available.    3. Health Maintenance.  A comprehensive panel of laboratory tests will be conducted, including glucose, liver function, kidney function, cholesterol, thyroid, B vitamins, folic acid, and vitamin D. Prostate screening will also be performed. He is advised to complete these tests while fasting.    Follow-up  The patient will follow up in 4 to 6 weeks to monitor his blood pressure.    PROCEDURE  Colonoscopy  was performed a few years ago, during which a polyp was identified and subsequently removed. A follow-up colonoscopy was performed after 5 years, with no abnormalities detected.    () Today's E/M visit is associated with medical care services that serve as the continuing focal point for all needed health care services and/or with medical care services that are part of ongoing care related to a patient's single, serious condition, or a complex condition: This includes furnishing services to patients on an ongoing basis that result in care that is personalized to the patient. The services result in a comprehensive, longitudinal, and continuous relationship with the patient and involve delivery of team-based care that is accessible, coordinated with other practitioners and providers, and integrated with the broader health care landscape.     ORDERS:  1. Encounter for medical examination to establish care      2. Essential hypertension    - olmesartan (BENICAR) 20 MG Tab; Take 1 Tablet by mouth every day.  Dispense: 30 Tablet; Refill: 3  - Comp Metabolic Panel; Future    3. Mixed hyperlipidemia    - LIPID PANEL    4. Alcohol use    - CBC WITH DIFFERENTIAL; Future  - VITAMIN B6; Future  - VIT B12,  FOLIC ACID    5. Encounter for vitamin deficiency screening    - VITAMIN D 25-HYDROXY    6. Screening for prostate cancer    - PROSTATE SPECIFIC AG SCREENING; Future    7. Screening for metabolic disorder    - HEMOGLOBIN A1C; Future          Follow Up: 4 weeks    Please note that this dictation was created using voice recognition software. I have made every reasonable attempt to correct obvious errors, but I expect that there are errors of grammar and possibly content that I did not discover before finalizing the note.      Attestation      Verbal consent was acquired by the patient to use Air[employer] ambient listening note generation during this visit Yes

## 2024-12-23 LAB — VIT B6 SERPL-MCNC: 64.9 NMOL/L (ref 20–125)

## 2025-01-19 ENCOUNTER — HOSPITAL ENCOUNTER (EMERGENCY)
Facility: MEDICAL CENTER | Age: 65
End: 2025-01-19
Attending: EMERGENCY MEDICINE
Payer: COMMERCIAL

## 2025-01-19 VITALS — HEIGHT: 68 IN | BODY MASS INDEX: 21.67 KG/M2 | WEIGHT: 143 LBS

## 2025-01-19 DIAGNOSIS — Z00.8 MEDICAL CLEARANCE FOR INCARCERATION: ICD-10-CM

## 2025-01-19 DIAGNOSIS — F10.920 ALCOHOLIC INTOXICATION WITHOUT COMPLICATION (HCC): ICD-10-CM

## 2025-01-19 PROCEDURE — 99284 EMERGENCY DEPT VISIT MOD MDM: CPT

## 2025-01-19 ASSESSMENT — FIBROSIS 4 INDEX: FIB4 SCORE: 1.69

## 2025-01-20 NOTE — ED TRIAGE NOTES
Chief Complaint   Patient presents with    Medical Clearance     BIB [employer] for clearance for incarceration, pt has no medical complaints.
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
## 2025-02-24 ENCOUNTER — OFFICE VISIT (OUTPATIENT)
Dept: MEDICAL GROUP | Age: 65
End: 2025-02-24
Payer: COMMERCIAL

## 2025-02-24 VITALS
OXYGEN SATURATION: 98 % | TEMPERATURE: 97.4 F | WEIGHT: 160 LBS | HEART RATE: 72 BPM | BODY MASS INDEX: 24.25 KG/M2 | DIASTOLIC BLOOD PRESSURE: 84 MMHG | SYSTOLIC BLOOD PRESSURE: 136 MMHG | HEIGHT: 68 IN

## 2025-02-24 DIAGNOSIS — I10 ESSENTIAL HYPERTENSION: ICD-10-CM

## 2025-02-24 DIAGNOSIS — E78.2 MIXED HYPERLIPIDEMIA: ICD-10-CM

## 2025-02-24 DIAGNOSIS — E55.9 VITAMIN D DEFICIENCY: ICD-10-CM

## 2025-02-24 PROCEDURE — 3079F DIAST BP 80-89 MM HG: CPT | Performed by: PHYSICIAN ASSISTANT

## 2025-02-24 PROCEDURE — 99214 OFFICE O/P EST MOD 30 MIN: CPT | Performed by: PHYSICIAN ASSISTANT

## 2025-02-24 PROCEDURE — 3075F SYST BP GE 130 - 139MM HG: CPT | Performed by: PHYSICIAN ASSISTANT

## 2025-02-24 RX ORDER — ROSUVASTATIN CALCIUM 10 MG/1
10 TABLET, COATED ORAL EVERY EVENING
Qty: 100 TABLET | Refills: 3 | Status: SHIPPED | OUTPATIENT
Start: 2025-02-24 | End: 2026-03-31

## 2025-02-24 RX ORDER — OLMESARTAN MEDOXOMIL 20 MG/1
20 TABLET ORAL DAILY
Qty: 90 TABLET | Refills: 3 | Status: SHIPPED | OUTPATIENT
Start: 2025-02-24

## 2025-02-24 RX ORDER — ERGOCALCIFEROL 1.25 MG/1
50000 CAPSULE, LIQUID FILLED ORAL
Qty: 12 CAPSULE | Refills: 1 | Status: SHIPPED | OUTPATIENT
Start: 2025-02-24

## 2025-02-24 ASSESSMENT — FIBROSIS 4 INDEX: FIB4 SCORE: 1.69

## 2025-02-24 ASSESSMENT — PATIENT HEALTH QUESTIONNAIRE - PHQ9: CLINICAL INTERPRETATION OF PHQ2 SCORE: 0

## 2025-02-24 NOTE — PROGRESS NOTES
"Subjective:     History of Present Illness  The patient is a 64-year-old male who presents for follow-up of hypertension, hypercholesterolemia, and vitamin D deficiency.    He reports a general sense of well-being since his last visit, with no specific concerns or queries. He has been adhering to the prescribed olmesartan regimen without experiencing any adverse effects such as dizziness, fainting, or weakness.    He has abstained from alcohol consumption for the past 4 months. His dietary habits have undergone significant changes, with a shift from a diet rich in steak and wine to one that includes salads, particularly during the evening meal. This dietary modification has resulted in an improvement in his overall health status.    SOCIAL HISTORY  He has not consumed alcohol for the past 4 months.    FAMILY HISTORY  His mother had a heart attack.    MEDICATIONS  Current: Olmesartan  Discontinued: Rosuvastatin      Current medicines (including changes today)  Current Outpatient Medications   Medication Sig Dispense Refill    vitamin D2, Ergocalciferol, (DRISDOL) 1.25 MG (39663 UT) Cap capsule Take 1 Capsule by mouth every 7 days. 12 Capsule 1    rosuvastatin (CRESTOR) 10 MG Tab Take 1 Tablet by mouth every evening. 100 Tablet 3    olmesartan (BENICAR) 20 MG Tab Take 1 Tablet by mouth every day. 30 Tablet 3     No current facility-administered medications for this visit.     He  has no past medical history on file.    ROS   No chest pain, no shortness of breath, no abdominal pain  Positive ROS as per HPI.  All other systems reviewed and are negative.     Objective:     /84   Pulse 72   Temp 36.3 °C (97.4 °F) (Temporal)   Ht 1.727 m (5' 8\")   Wt 72.6 kg (160 lb)   SpO2 98%  Body mass index is 24.33 kg/m².   Physical Exam  Vital Signs  Blood pressure is 136/84.  Constitutional: Alert, no distress.  Skin: Warm, dry, good turgor, no rashes in visible areas.  Eye: Equal, round and reactive, conjunctiva clear, " lids normal.  ENMT: Lips without lesions, good dentition, oropharynx clear.  Neck: Trachea midline, no masses, no thyromegaly. No cervical or supraclavicular lymphadenopathy  Respiratory: Unlabored respiratory effort, lungs clear to auscultation, no wheezes, no ronchi.  Cardiovascular: Normal S1, S2, no murmur, no edema.  Abdomen: Soft, non-tender, no masses, no hepatosplenomegaly.  Psych: Alert and oriented x3, normal affect and mood.      Results  Laboratory Studies  Total cholesterol is 303. LDL cholesterol is 211. Vitamin D level is 17.     Latest Reference Range & Units 12/19/24 11:35   WBC 4.8 - 10.8 K/uL 5.5   RBC 4.70 - 6.10 M/uL 4.96   Hemoglobin 14.0 - 18.0 g/dL 15.8   Hematocrit 42.0 - 52.0 % 48.2   MCV 81.4 - 97.8 fL 97.2   MCH 27.0 - 33.0 pg 31.9   MCHC 32.3 - 36.5 g/dL 32.8   RDW 35.9 - 50.0 fL 44.2   Platelet Count 164 - 446 K/uL 186   MPV 9.0 - 12.9 fL 10.2   Neutrophils-Polys 44.00 - 72.00 % 43.50 (L)   Neutrophils (Absolute) 1.82 - 7.42 K/uL 2.39   Lymphocytes 22.00 - 41.00 % 38.10   Lymphs (Absolute) 1.00 - 4.80 K/uL 2.10   Monocytes 0.00 - 13.40 % 10.70   Monos (Absolute) 0.00 - 0.85 K/uL 0.59   Eosinophils 0.00 - 6.90 % 6.50   Eos (Absolute) 0.00 - 0.51 K/uL 0.36   Basophils 0.00 - 1.80 % 0.70   Baso (Absolute) 0.00 - 0.12 K/uL 0.04   Immature Granulocytes 0.00 - 0.90 % 0.50   Immature Granulocytes (abs) 0.00 - 0.11 K/uL 0.03   Nucleated RBC 0.00 - 0.20 /100 WBC 0.00   NRBC (Absolute) K/uL 0.00   Sodium 135 - 145 mmol/L 138   Potassium 3.6 - 5.5 mmol/L 4.8   Chloride 96 - 112 mmol/L 104   Co2 20 - 33 mmol/L 23   Anion Gap 7.0 - 16.0  11.0   Glucose 65 - 99 mg/dL 105 (H)   Bun 8 - 22 mg/dL 17   Creatinine 0.50 - 1.40 mg/dL 1.21   GFR (CKD-EPI) >60 mL/min/1.73 m 2 67   Calcium 8.5 - 10.5 mg/dL 9.0   Correct Calcium 8.5 - 10.5 mg/dL 8.8   AST(SGOT) 12 - 45 U/L 39   ALT(SGPT) 2 - 50 U/L 61 (H)   Alkaline Phosphatase 30 - 99 U/L 53   Total Bilirubin 0.1 - 1.5 mg/dL 0.4   Albumin 3.2 - 4.9 g/dL 4.3    Total Protein 6.0 - 8.2 g/dL 7.6   Globulin 1.9 - 3.5 g/dL 3.3   A-G Ratio g/dL 1.3   Glycohemoglobin 4.0 - 5.6 % 5.6   Estim. Avg Glu mg/dL 114   Fasting Status  Fasting   Cholesterol,Tot 100 - 199 mg/dL 303 (H)   Triglycerides 0 - 149 mg/dL 223 (H)   HDL >=40 mg/dL 47   LDL <100 mg/dL 211 (H)   25-Hydroxy   Vitamin D 25 30 - 100 ng/mL 17 (L)   Folate -Folic Acid >4.0 ng/mL 5.7   Prostatic Specific Antigen Tot 0.00 - 4.00 ng/mL 1.49   Vitamin B12 -True Cobalamin 211 - 911 pg/mL 638   Vitamin B6 20.0 - 125.0 nmol/L 64.9   (L): Data is abnormally low  (H): Data is abnormally high    Assessment and Plan:   The following treatment plan was discussed    Assessment & Plan  1. Hypertension.  His blood pressure has shown improvement, likely due to recent lifestyle modifications. He will continue with the current dosage of olmesartan 20 mg. A prescription for a 90-day supply with refills has been provided. If his blood pressure remains elevated at the next visit, the dosage may be increased to 40 mg.    2. Hypercholesterolemia.  His total cholesterol is significantly elevated at 303, with an LDL level of 211. Given his family history of cardiac disease, it is crucial to manage his cholesterol levels effectively. He has been advised to limit alcohol intake and maintain a healthy diet rich in vegetables and water. A prescription for rosuvastatin 10 mg has been issued. He is instructed to discontinue the medication and inform the provider if any adverse effects occur. A liver panel will be conducted in 4 to 6 weeks to monitor for potential side effects of the medication.    3. Vitamin D deficiency.  His vitamin D level is notably low at 17, indicating a deficiency. A prescription for vitamin D 50,000 IU, to be taken once weekly, has been provided. He will adhere to this regimen for approximately 6 months, after which his vitamin D level will be reassessed.    Follow-up  The patient will follow up in 6 months for an annual  physical examination.      Rashaad was seen today for follow-up.    Diagnoses and all orders for this visit:    Essential hypertension  -     olmesartan (BENICAR) 20 MG Tab; Take 1 Tablet by mouth every day.    Mixed hyperlipidemia  -     rosuvastatin (CRESTOR) 10 MG Tab; Take 1 Tablet by mouth every evening.  -     Comp Metabolic Panel; Future  -     Lipid Profile; Future    Vitamin D deficiency  -     VITAMIN D 25-HYDROXY  -     vitamin D2, Ergocalciferol, (DRISDOL) 1.25 MG (38121 UT) Cap capsule; Take 1 Capsule by mouth every 7 days.        () Today's E/M visit is associated with medical care services that serve as the continuing focal point for all needed health care services and/or with medical care services that are part of ongoing care related to a patient's single, serious condition, or a complex condition: This includes furnishing services to patients on an ongoing basis that result in care that is personalized to the patient. The services result in a comprehensive, longitudinal, and continuous relationship with the patient and involve delivery of team-based care that is accessible, coordinated with other practitioners and providers, and integrated with the broader health care landscape.           Follow Up: 6 months  annual    Please note that this dictation was created using voice recognition software. I have made every reasonable attempt to correct obvious errors, but I expect that there are errors of grammar and possibly content that I did not discover before finalizing the note.      Attestation      Verbal consent was acquired by the patient to use [employer] ambient listening note generation during this visit Yes

## 2025-06-04 DIAGNOSIS — I10 ESSENTIAL HYPERTENSION: ICD-10-CM

## 2025-06-05 RX ORDER — OLMESARTAN MEDOXOMIL 20 MG/1
20 TABLET ORAL DAILY
Qty: 90 TABLET | Refills: 3 | Status: SHIPPED | OUTPATIENT
Start: 2025-06-05